# Patient Record
Sex: MALE | Race: WHITE
[De-identification: names, ages, dates, MRNs, and addresses within clinical notes are randomized per-mention and may not be internally consistent; named-entity substitution may affect disease eponyms.]

---

## 2018-08-10 ENCOUNTER — HOSPITAL ENCOUNTER (EMERGENCY)
Dept: HOSPITAL 56 - MW.ED | Age: 23
Discharge: HOME | End: 2018-08-10
Payer: COMMERCIAL

## 2018-08-10 DIAGNOSIS — Z77.098: Primary | ICD-10-CM

## 2018-08-10 NOTE — EDM.PDOC
ED HPI GENERAL MEDICAL PROBLEM





- General


Chief Complaint: Eye Problems


Stated Complaint: CHEMICALS WENT IN PT'S EYE


Time Seen by Provider: 08/10/18 08:23


Source of Information: Reports: Patient


History Limitations: Reports: No Limitations





- History of Present Illness


INITIAL COMMENTS - FREE TEXT/NARRATIVE: 





Ren Castillo is a 21 y/o male presenting today to the ER after a drain 

cleaning chemical got into his eyes. patient states that he was at work at 

[employer] when he was pouring drain cleaning fluid down the drain and some of the 

fluid got into his eyes. Initially it was irritating and burning sensation. He 

drained both eyes with water for approximately 10 minutes. Burning sensation 

resolved.  No change in vision. Does not feel any foreign bodies in eyes. Does 

not wear contact lenses.


  ** Right Eye


Pain Score (Numeric/FACES): 3





- Related Data


 Allergies











Allergy/AdvReac Type Severity Reaction Status Date / Time


 


No Known Allergies Allergy   Verified 08/10/18 08:19











Home Meds: 


 Home Meds





. [No Known Home Meds]  08/10/18 [History]











Past Medical History





- Past Health History


Medical/Surgical History: Denies Medical/Surgical History





- Infectious Disease History


Infectious Disease History: Reports: None





Social & Family History





- Family History


Family Medical History: Noncontributory





- Tobacco Use


Smoking Status *Q: Never Smoker





- Caffeine Use


Caffeine Use: Reports: None





- Recreational Drug Use


Recreational Drug Use: No





ED ROS GENERAL





- Review of Systems


Review Of Systems: See Below


Constitutional: Reports: No Symptoms


HEENT: Reports: Eye Pain


Respiratory: Reports: No Symptoms


Cardiovascular: Reports: No Symptoms


Endocrine: Reports: No Symptoms


GI/Abdominal: Reports: No Symptoms


: Reports: No Symptoms


Musculoskeletal: Reports: No Symptoms


Skin: Reports: No Symptoms


Neurological: Reports: No Symptoms





ED EXAM GENERAL W FULL EYE





- Physical Exam


Exam: See Below


Exam Limited By: No Limitations


General Appearance: Alert, WD/WN, No Apparent Distress


Eye Exam: Bilateral Eye: Conjunctival Injection


Eyelids: Bilateral: Normal Appearance


Cornea Exam: Bilateral: Normal Appearance, Examined with Flourescein (no 

corneal abrasions or foreign bodies.)


Extraocular Movements: Bilateral: Intact


Pupils: Normal Accommodation


Throat/Mouth: Normal Inspection, Normal Lips, Normal Teeth, Normal Gums, Normal 

Oropharynx, Normal Voice, No Airway Compromise


Head: Atraumatic, Normocephalic


Respiratory/Chest: No Respiratory Distress, Lungs Clear, Normal Breath Sounds, 

No Accessory Muscle Use, Chest Non-Tender


Cardiovascular: Normal Peripheral Pulses, Regular Rate, Rhythm, No Edema, No 

Gallop, No JVD, No Murmur, No Rub


GI/Abdominal: Normal Bowel Sounds, Soft, Non-Tender, No Organomegaly, No 

Distention, No Abnormal Bruit, No Mass


Extremities: Normal Inspection, Normal Range of Motion, Non-Tender, Normal 

Capillary Refill, No Pedal Edema


Neurological: Alert, Oriented, CN II-XII Intact, Normal Cognition, Normal Gait, 

Normal Reflexes, No Motor/Sensory Deficits


Skin Exam: Warm, Dry, Intact, Normal Color, No Rash





Course





- Vital Signs


Last Recorded V/S: 


 Last Vital Signs











Temp  36.6 C   08/10/18 08:19


 


Pulse  78   08/10/18 08:19


 


Resp  16   08/10/18 08:19


 


BP  119/81   08/10/18 08:19


 


Pulse Ox  98   08/10/18 08:19














- Orders/Labs/Meds


Meds: 


Medications














Discontinued Medications














Generic Name Dose Route Start Last Admin





  Trade Name Kaila  PRN Reason Stop Dose Admin


 


Proparacaine HCl  1 ml  08/10/18 08:30  08/10/18 08:41





  Proparacaine 0.5% Ophth Soln  EYEBOTH  08/10/18 08:31  1 dose





  ONETIME ONE   Administration





     





     





     





     














Departure





- Departure


Time of Disposition: 10:06


Disposition: Home, Self-Care 01


Condition: Good


Clinical Impression: 


 Chemical exposure of eye








- Discharge Information


*PRESCRIPTION DRUG MONITORING PROGRAM REVIEWED*: Not Applicable


*COPY OF PRESCRIPTION DRUG MONITORING REPORT IN PATIENT CANDY: Not Applicable


Referrals: 


PCP,None [Primary Care Provider] - 


Forms:  ED Department Discharge


Additional Instructions: 


Follow-up with Dr. Mercado in 1-2 weeks. If you notice vision changes seek 

medical attention.